# Patient Record
(demographics unavailable — no encounter records)

---

## 2024-11-14 NOTE — HISTORY OF PRESENT ILLNESS
[FreeTextEntry1] : Ms. 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) who originally presented on 23 to [facility] with hemoperitoneum.  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 Inhibin A was normal but Inhibin B aw markedly elevated to   She underwent TLH LSO removal of right tube and ovary, debulking evacuation of hemoperitoneum and cystoscopy.  She receive 2U pRBCs.  Intraop surgery was uncomplicated.    Pathology-  Uterine metastasis- c/w GCT adult type   Uterus cervix left fallopian tube and left ovaries- inactive endometrium benign endometrial polyp.  myometrium, multiple leiomyomas, adenomyosis cervix, uterine serosa.  No malignancy is identified.  Left ovary and fallopian tube- no malignancy is identified    Anterior abdominal wall excision- GCT adult type   Cul-de-sac mass excision- GCT adult type fragment of histologically unremarkable fallopian tube   Right pelvic mass (excision)- granulosa cell tumor adult type, fragment of histologically unremarkable fallopian tube   Inhibin A was normal Inhibin B was markedly elevated to > 3300 on 23   At the 2 week postop check pt reported persistent lower abdominal pain and loose stools.  The repeat Inhibin B was 184.  A left pelvic mass was appreciated on exam and CT confirming a 12 cm pelvic fluid collection ?abscess vs hematoma.  A 4 cm right reena-rectal mass c/w disease was noted.    She was admitted and IR drainage of 150 ml of purulent fluid and drain placement.  Repeat CT 23 was improved.   Drain was removed 23.  2023-10/27/2023- she received cycles 1-6 of Carbo/Taxol c/w HTN diastolic BP in 100s. Repeat Inhibin B on 10/3/24 270  2023- Repeat CT CAP- increased size of right perirectal mass with solid component with measuring 4.9 x 4.6 cm previously 4.4 x 3.2 cm, increased size of additional hypoattenuating component anterior inferiorly with slightly greater than simple fluid attenuation, probably cystic measuring 5.7 x 5.1 cm, previously 3.4 x 3.3 cm.  There is increased mass effect on the rectum by mass. Chest CT negative, pt tolerated the chemo well and c/o only fatigue and alopecia  24- Robotic resection presacral mass, flexible sigmoidoscopy, LAP colectomy, partial with anastomosis. Inhibin B was elevated further to 450 on day of surgery. Pathology-  Pelvic mass excision- Granulosa cell tumor, adult type, similar to that seen on previous   2024- Inhibin a - 12.4 inhibin b- 112  She denies abdominal/pelvic pain, dyspnea or chest pain, vaginal bleeding or discharge, nausea/vomiting, changes in bowel habits or urination, lower extremity edema or pain.

## 2024-11-14 NOTE — HISTORY OF PRESENT ILLNESS
[FreeTextEntry1] : Ms. 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 Inhibin A was normal but Inhibin B aw markedly elevated to   She underwent TLH LSO removal of right tube and ovary, debulking evacuation of hemoperitoneum and cystoscopy.  She receive 2U pRBCs.  Intraop surgery was uncomplicated.    Pathology-  Uterine metastasis- c/w GCT adult type   Uterus cervix left fallopian tube and left ovaries- inactive endometrium benign endometrial polyp.  myometrium, multiple leiomyomas, adenomyosis cervix, uterine serosa.  No malignancy is identified.  Left ovary and fallopian tube- no malignancy is identified    Anterior abdominal wall excision- GCT adult type   Cul-de-sac mass excision- GCT adult type fragment of histologically unremarkable fallopian tube   Right pelvic mass (excision)- granulosa cell tumor adult type, fragment of histologically unremarkable fallopian tube   Inhibin A was normal Inhibin B was markedly elevated to > 3300 on 23   At the 2 week postop check pt reported persistent lower abdominal pain and loose stools.  The repeat Inhibin B was 184.  A left pelvic mass was appreciated on exam and CT confirming a 12 cm pelvic fluid collection ?abscess vs hematoma.  A 4 cm right reena-rectal mass c/w disease was noted.    She was admitted and IR drainage of 150 ml of purulent fluid and drain placement.  Repeat CT 23 was improved.   Drain was removed 23.  2023-10/27/2023- she received cycles 1-6 of Carbo/Taxol c/w HTN diastolic BP in 100s. Repeat Inhibin B on 10/3/24 270  2023- Repeat CT CAP- increased size of right perirectal mass with solid component with measuring 4.9 x 4.6 cm previously 4.4 x 3.2 cm, increased size of additional hypoattenuating component anterior inferiorly with slightly greater than simple fluid attenuation, probably cystic measuring 5.7 x 5.1 cm, previously 3.4 x 3.3 cm.  There is increased mass effect on the rectum by mass. Chest CT negative, pt tolerated the chemo well and c/o only fatigue and alopecia  24- Robotic resection presacral mass, flexible sigmoidoscopy, LAP colectomy, partial with anastomosis. Inhibin B was elevated further to 450 on day of surgery. Pathology-  Pelvic mass excision- Granulosa cell tumor, adult type, similar to that seen on previous   2024- Inhibin a - 12.4 inhibin b- 112  She denies abdominal/pelvic pain, dyspnea or chest pain, vaginal bleeding or discharge, nausea/vomiting, changes in bowel habits or urination, lower extremity edema or pain.

## 2024-11-14 NOTE — OB HISTORY
[Total Preg ___] : : [unfilled] [Full Term ___] : [unfilled] (full-term) [Abortions ___] : [unfilled] (abortions) [Living ___] : [unfilled] (living) [Vaginal ___] : [unfilled] vaginal delivery(s) [Ectopics ___] : [unfilled] ectopic pregnancies [Approximately ___ (Month)] : the LMP was approximately [unfilled] month(s) ago

## 2024-11-14 NOTE — ASSESSMENT
[FreeTextEntry1] : 55 year old woman with hx of metastatic granulosa cell tumor s/p robotic assisted resection of a residual right perirectal mass in Feb 2024. She has no evidence of disease on exam and no worrisome physical findings. Serum Inhibin B remains elevated to 112, although lower than at the time of her most recent debulking.   1. Repeat Inhibin B 2. CT scan of the C/A/P to rule out recurrence

## 2024-11-14 NOTE — PHYSICAL EXAM
[Chaperone Present] : A chaperone was present in the examining room during all aspects of the physical examination [96477] : A chaperone was present during the pelvic exam. [Absent] : Adnexa(ae): Absent [Normal] : Recto-Vaginal Exam: Normal [Fully active, able to carry on all pre-disease performance without restriction] : Status 0 - Fully active, able to carry on all pre-disease performance without restriction

## 2024-11-14 NOTE — PHYSICAL EXAM
[Chaperone Present] : A chaperone was present in the examining room during all aspects of the physical examination [10222] : A chaperone was present during the pelvic exam. [Absent] : Adnexa(ae): Absent [Normal] : Recto-Vaginal Exam: Normal [Fully active, able to carry on all pre-disease performance without restriction] : Status 0 - Fully active, able to carry on all pre-disease performance without restriction

## 2024-11-14 NOTE — PAST MEDICAL HISTORY
[Postmenopausal] : The patient is postmenopausal [Definite ___ (Date)] : the last menstrual period was [unfilled] [Total Preg ___] : G[unfilled] [Live Births ___] : P[unfilled]  [Full Term ___] : Full Term: [unfilled] [Abortions ___] : Abortions:[unfilled] [AB Spont ___] : miscarriages: [unfilled]